# Patient Record
Sex: MALE | Race: BLACK OR AFRICAN AMERICAN | NOT HISPANIC OR LATINO | URBAN - METROPOLITAN AREA
[De-identification: names, ages, dates, MRNs, and addresses within clinical notes are randomized per-mention and may not be internally consistent; named-entity substitution may affect disease eponyms.]

---

## 2018-03-25 ENCOUNTER — EMERGENCY (EMERGENCY)
Facility: HOSPITAL | Age: 38
LOS: 1 days | Discharge: HOME | End: 2018-03-25
Attending: EMERGENCY MEDICINE
Payer: SELF-PAY

## 2018-03-25 VITALS
DIASTOLIC BLOOD PRESSURE: 89 MMHG | HEIGHT: 68 IN | RESPIRATION RATE: 16 BRPM | HEART RATE: 69 BPM | WEIGHT: 190.04 LBS | TEMPERATURE: 98 F | OXYGEN SATURATION: 97 % | SYSTOLIC BLOOD PRESSURE: 140 MMHG

## 2018-03-25 PROCEDURE — 12001 RPR S/N/AX/GEN/TRNK 2.5CM/<: CPT

## 2018-03-25 PROCEDURE — 99284 EMERGENCY DEPT VISIT MOD MDM: CPT | Mod: 25

## 2018-03-25 PROCEDURE — 99053 MED SERV 10PM-8AM 24 HR FAC: CPT

## 2018-03-26 PROCEDURE — 12001 RPR S/N/AX/GEN/TRNK 2.5CM/<: CPT

## 2018-03-26 PROCEDURE — 99283 EMERGENCY DEPT VISIT LOW MDM: CPT | Mod: 25

## 2018-03-26 RX ORDER — ACETAMINOPHEN 500 MG
650 TABLET ORAL ONCE
Qty: 0 | Refills: 0 | Status: COMPLETED | OUTPATIENT
Start: 2018-03-26 | End: 2018-03-26

## 2018-03-26 RX ADMIN — Medication 650 MILLIGRAM(S): at 00:14

## 2018-03-26 NOTE — ED PROVIDER NOTE - OBJECTIVE STATEMENT
38 y/o M with no significant PMHx presents to ED c/o laceration to L fifth digit. 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 Denies any weakness or any other complaints. NKDA.

## 2018-03-26 NOTE — ED PROVIDER NOTE - MUSCULOSKELETAL, MLM
Spine appears normal, range of motion is not limited, no muscle or joint tenderness. full range of motion of L fifth digit. No bony involvement.

## 2018-04-03 ENCOUNTER — EMERGENCY (EMERGENCY)
Facility: HOSPITAL | Age: 38
LOS: 1 days | Discharge: HOME | End: 2018-04-03
Attending: EMERGENCY MEDICINE
Payer: SELF-PAY

## 2018-04-03 VITALS
TEMPERATURE: 98 F | DIASTOLIC BLOOD PRESSURE: 78 MMHG | WEIGHT: 199.96 LBS | HEIGHT: 69 IN | SYSTOLIC BLOOD PRESSURE: 150 MMHG | OXYGEN SATURATION: 99 % | HEART RATE: 72 BPM | RESPIRATION RATE: 16 BRPM

## 2018-04-03 PROCEDURE — G0463: CPT

## 2018-04-03 NOTE — ED PROVIDER NOTE - OBJECTIVE STATEMENT
38 y/o M pt w/ no PMhx presents to the ED for suture removal. Wound healing well. Pt denies any other complaints. NKDA. 38 y/o M pt w/ no PMhx presents to the ED for suture removal. left hand 5th digit.   Wound healing well. Pt denies any other complaints. NKDA.

## 2021-12-16 NOTE — ED PROVIDER NOTE - CROS ED NEURO ALL NEG
- - - Positioning (Leave Blank If You Do Not Want): The patient was placed in a comfortable position exposing the surgical site.

## 2025-02-25 NOTE — ED PROVIDER NOTE - CPE EDP SKIN NORM
Continued Stay SW/CM Assessment/Plan of Care Note     Active Substitute Decision Maker (SDM)    There are no active Substitute Decision Maker (SDM) on file.       Progress note:  Pt remains accepted at [facility] and they will contact the charge RN when a bed is available for transfer.  Transportation placed on will call with [facility] for transfer, PCS form on chart.  Pt and daughter had questions regarding care att [facility].  Daughter stated that they do not feel heard by her MDs, and wanted to know what assistance [facility] can provide.  Explained to both using a video  to express their concerns at the hospital with the CM, manager, pt RN, and the physicians caring for the pt.  Daughter verbalized understanding.    See / flowsheets for other objective data.    Disposition Recommendations:  Preliminary discharge destination: Planned Discharge Destination: Arrangements in progress  / recommendation for discharge: Other (comment) ([facility])    Discharge Plan/Needs: Transfer to [facility]    Continued Care and Services - Admitted Since 2/21/2025    No active coordination exists for this encounter.       Devices/ Equipment that need to be arranged for discharge: None at this time     Prior To Hospitalization:    Living Situation: Spouse and residing at House    .  Support Systems: Family members   Home Devices/Equipment: None            Mobility Assist Devices: None   Type of Service Prior to Hospitalization: None               Patient/Family discharge goal (s):  Other (comment) ([facility])     Therapy Recommendations for Discharge: PT/OT not ordered at this time.     Barriers to Discharge  Identified Barriers to Discharge/Transition Planning: Medical necessity for acute care (Advance/tolerate diet; IV ABX; Lytes; LAbs; IVF)                   normal...